# Patient Record
Sex: FEMALE | Race: WHITE
[De-identification: names, ages, dates, MRNs, and addresses within clinical notes are randomized per-mention and may not be internally consistent; named-entity substitution may affect disease eponyms.]

---

## 2024-03-28 ENCOUNTER — APPOINTMENT (OUTPATIENT)
Dept: ORTHOPEDIC SURGERY | Facility: CLINIC | Age: 59
End: 2024-03-28
Payer: COMMERCIAL

## 2024-03-28 VITALS — BODY MASS INDEX: 29.08 KG/M2 | HEIGHT: 62 IN | WEIGHT: 158 LBS

## 2024-03-28 DIAGNOSIS — Z78.9 OTHER SPECIFIED HEALTH STATUS: ICD-10-CM

## 2024-03-28 DIAGNOSIS — Z87.19 PERSONAL HISTORY OF OTHER DISEASES OF THE DIGESTIVE SYSTEM: ICD-10-CM

## 2024-03-28 DIAGNOSIS — M17.11 UNILATERAL PRIMARY OSTEOARTHRITIS, RIGHT KNEE: ICD-10-CM

## 2024-03-28 DIAGNOSIS — J45.909 UNSPECIFIED ASTHMA, UNCOMPLICATED: ICD-10-CM

## 2024-03-28 DIAGNOSIS — Z86.59 PERSONAL HISTORY OF OTHER MENTAL AND BEHAVIORAL DISORDERS: ICD-10-CM

## 2024-03-28 PROBLEM — Z00.00 ENCOUNTER FOR PREVENTIVE HEALTH EXAMINATION: Status: ACTIVE | Noted: 2024-03-28

## 2024-03-28 PROCEDURE — 20610 DRAIN/INJ JOINT/BURSA W/O US: CPT | Mod: RT

## 2024-03-28 PROCEDURE — 73562 X-RAY EXAM OF KNEE 3: CPT | Mod: RT

## 2024-03-28 PROCEDURE — 99203 OFFICE O/P NEW LOW 30 MIN: CPT | Mod: 25

## 2024-03-28 RX ORDER — ESCITALOPRAM OXALATE 5 MG/1
TABLET, FILM COATED ORAL
Refills: 0 | Status: ACTIVE | COMMUNITY

## 2024-03-28 NOTE — PLAN
[TextEntry] : The natural progression of osteoarthritis was explained to the patient.  We discussed the possible treatment options from conservative to operative.  These included NSAIDs, Glucosamine and Chondroitin sulfate, and physical therapy as well different types of injections.  We also discussed that at some point they may progress to need a TKA.  Information and pamphlets were given.  Medication was injected into the above treated area. After verbal consent using sterile preparation and technique. The risks, benefits, and alternatives to cortisone injection were explained in full to the patient. Risks outlined include but are not limited to infection, sepsis, bleeding, scarring, skin discoloration, temporary increase in pain, syncopal episode, failure to resolve symptoms, allergic reaction, symptom recurrence, and elevation of blood sugar in diabetics. Patient understood the risks. All questions were answered. After discussion of options, patient requested an injection. Oral informed consent was obtained and sterile prep was done of the injection site. Sterile technique was utilized for the procedure including the preparation of the solutions used for the injection. Patient tolerated the procedure well. Advised to ice the injection site this evening. Prep with Betadine locally to site. Sterile technique used. Patient tolerated procedure well. Post Procedure Instructions: Patient was advised to call if redness, pain, or fever occur and apply ice for 15 min. out of every hour for the next 12-24 hours as tolerated.  The patient was instructed on the importance of ice and elevation of the extremity to decrease swelling and pain.  If  no improvement consider gel injections.

## 2024-03-28 NOTE — PHYSICAL EXAM
[Able to Communicate] : able to communicate [Well Developed] : well developed [Well Nourished] : well nourished [NL (0)] : extension 0 degrees [NL (140)] : flexion 140 degrees [5___] : hamstring 5[unfilled]/5 [Right] : right knee [AP] : anteroposterior [Lateral] : lateral [Pajonal] : skyline [Degenerative change] : Degenerative change [de-identified] : average [] : negative Lachmann [FreeTextEntry9] : Mild narrowing lateral compartment

## 2024-03-28 NOTE — HISTORY OF PRESENT ILLNESS
[8] : 8 [3] : 3 [Sharp] : sharp [Constant] : constant [Nothing helps with pain getting better] : Nothing helps with pain getting better [Walking] : walking [Stairs] : stairs [Part time] : Work status: part time [de-identified] : 3/28/24  Initial visit for this 58 year old female  complaining of spontaneous onset of rt knee pain x last 4 weeks duration. Has a hx of arthroscopic menisectomy rt knee in 4/21 and debridement for arthritis. Went for PT which did not help. Was under care of Dr. Cate Lackey at [facility] and a series of gel injections and cortisone injections in the past with temp. relief. Last cortisone injection x 10 months ago which helped until now. Takes advil 400mg prn with little relief. No pain at rest. Used to walk 3-4 miles/day. [] : no [FreeTextEntry1] : right knee [de-identified] : getting up [de-identified] : 2021 [de-identified] : Dr Lackey -S [de-identified] : 4/18/21 [de-identified] : [employer] [de-identified] : 4/21